# Patient Record
Sex: MALE | Race: WHITE | Employment: FULL TIME | URBAN - METROPOLITAN AREA
[De-identification: names, ages, dates, MRNs, and addresses within clinical notes are randomized per-mention and may not be internally consistent; named-entity substitution may affect disease eponyms.]

---

## 2017-01-26 ENCOUNTER — OFFICE VISIT (OUTPATIENT)
Dept: FAMILY MEDICINE | Facility: OTHER | Age: 52
End: 2017-01-26
Payer: COMMERCIAL

## 2017-01-26 VITALS
DIASTOLIC BLOOD PRESSURE: 93 MMHG | SYSTOLIC BLOOD PRESSURE: 168 MMHG | HEART RATE: 80 BPM | OXYGEN SATURATION: 99 % | TEMPERATURE: 98.3 F | RESPIRATION RATE: 16 BRPM

## 2017-01-26 DIAGNOSIS — R07.89 TIGHTNESS IN CHEST: Primary | ICD-10-CM

## 2017-01-26 DIAGNOSIS — Z77.29 CARBON MONOXIDE EXPOSURE: ICD-10-CM

## 2017-01-26 DIAGNOSIS — R07.0 THROAT PAIN: ICD-10-CM

## 2017-01-26 PROCEDURE — 93000 ELECTROCARDIOGRAM COMPLETE: CPT | Performed by: NURSE PRACTITIONER

## 2017-01-26 PROCEDURE — 99215 OFFICE O/P EST HI 40 MIN: CPT | Performed by: NURSE PRACTITIONER

## 2017-01-26 ASSESSMENT — PAIN SCALES - GENERAL: PAINLEVEL: SEVERE PAIN (6)

## 2017-01-26 NOTE — NURSING NOTE
"No chief complaint on file.      Initial /93 mmHg  Pulse 80  Temp(Src) 98.3  F (36.8  C) (Oral)  Resp 16  SpO2 99% Estimated body mass index is 29.39 kg/(m^2) as calculated from the following:    Height as of 2/10/12: 5' 6\" (1.676 m).    Weight as of 2/10/12: 182 lb (82.555 kg).  BP completed using cuff size: regular    "

## 2017-01-31 ENCOUNTER — TELEPHONE (OUTPATIENT)
Dept: FAMILY MEDICINE | Facility: OTHER | Age: 52
End: 2017-01-31

## 2017-01-31 NOTE — TELEPHONE ENCOUNTER
Pardeep Cortez is a 51 year old male who presents to clinic as a walk in with complaints of upper back pain and lightheadedness.    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 factors:  Unknown; pt walked in to the clinic last week experiencing similar symptoms along with chest pain - was sent to ED for evaluation. Pt went to [facility] and cardiac work up was negative. He was sent home on an inhaler for chest congestion. He was seen in Urgent Care this weekend and was prescribed a Z-pack. His chest congestion has improved.  Associated symptoms: none; Denies: chest pain/pressure, SOB   Improves/worsens symptoms:  Nothing seems to help sxs or make them better  Pain scale (0-10)   3/10  Last exam/Treatment:  1/26/17  Allergies:   Allergies   Allergen Reactions     No Known Drug Allergies      Vitals in clinic:   T: 98.1 (o)  P: 77  BP: 156/99    MEDICATIONS:   Taking medication(s) as prescribed? Taking Z-pack, also takes Ambien at night    NURSING PLAN: Huddle with provider, plan includes : unable to see pt today due to time of day. If pt feels unstable, he should go to ED, otherwise we can see him tomorrow    RECOMMENDED DISPOSITION:  Advised pt of above Nursing Plan per huddle with providers   Will comply with recommendation: Yes - he did not wish to schedule appt tomorrow as he is going out of town. He states that if he worsens he will go to ED. I strongly advised someone come pick him up but he insists he is stable enough to drive. His gait is steady.  If further questions/concerns or if symptoms do not improve, worsen or new symptoms develop, call your PCP or [facility] as soon as possible.      Guideline used:  Telephone Triage Protocols for  Nurses, Fourth Edition, Nola Felton  Back Pain  Message handled by Nurse Triage with Huddle - provider name: Erlinda Montero CNP (previous encounter provider) & Delio Garcia PA-C (Designated Provider).      SULLY AREVALO RN

## 2019-02-07 ENCOUNTER — MYC MEDICAL ADVICE (OUTPATIENT)
Dept: FAMILY MEDICINE | Facility: OTHER | Age: 54
End: 2019-02-07

## 2019-11-28 ENCOUNTER — MYC MEDICAL ADVICE (OUTPATIENT)
Dept: FAMILY MEDICINE | Facility: OTHER | Age: 54
End: 2019-11-28

## 2019-11-28 DIAGNOSIS — G47.00 INSOMNIA: ICD-10-CM

## 2019-12-08 ENCOUNTER — HEALTH MAINTENANCE LETTER (OUTPATIENT)
Age: 54
End: 2019-12-08

## 2021-01-09 ENCOUNTER — HEALTH MAINTENANCE LETTER (OUTPATIENT)
Age: 56
End: 2021-01-09

## 2021-10-23 ENCOUNTER — HEALTH MAINTENANCE LETTER (OUTPATIENT)
Age: 56
End: 2021-10-23

## 2022-02-12 ENCOUNTER — HEALTH MAINTENANCE LETTER (OUTPATIENT)
Age: 57
End: 2022-02-12

## 2022-10-09 ENCOUNTER — HEALTH MAINTENANCE LETTER (OUTPATIENT)
Age: 57
End: 2022-10-09

## 2023-01-16 ENCOUNTER — OFFICE VISIT (OUTPATIENT)
Dept: URBAN - METROPOLITAN AREA CLINIC 85 | Facility: CLINIC | Age: 58
End: 2023-01-16
Payer: COMMERCIAL

## 2023-01-16 DIAGNOSIS — H10.9 CONJUNCTIVITIS: Primary | ICD-10-CM

## 2023-01-16 DIAGNOSIS — H04.123 DRY EYE SYNDROME OF BILATERAL LACRIMAL GLANDS: ICD-10-CM

## 2023-01-16 DIAGNOSIS — H25.13 AGE-RELATED NUCLEAR CATARACT, BILATERAL: ICD-10-CM

## 2023-01-16 PROCEDURE — 92004 COMPRE OPH EXAM NEW PT 1/>: CPT | Performed by: OPHTHALMOLOGY

## 2023-01-16 RX ORDER — OFLOXACIN 3 MG/ML
0.3 % SOLUTION/ DROPS OPHTHALMIC
Qty: 1 | Refills: 1 | Status: ACTIVE
Start: 2023-01-16

## 2023-01-16 ASSESSMENT — INTRAOCULAR PRESSURE
OS: 20
OD: 22

## 2023-01-16 ASSESSMENT — VISUAL ACUITY
OD: 20/30
OS: 20/20

## 2023-01-16 NOTE — IMPRESSION/PLAN
Impression: Conjunctivitis: H10.9. Plan: Discussed diagnosis in detail with patient. Will continue to observe condition and or symptoms. D/C Lotemax and Initiate Ocuflox OU QID for 1 week then D/C. Patient instructed to call if condition gets worse. RTC in 1 year for follow up or   ASAP if there should be any decrease in vision, pain, or any worsening of condition.

## 2023-01-16 NOTE — IMPRESSION/PLAN
Impression: Age-related nuclear cataract, bilateral: H25.13. Plan: Discussed findings. Discussed option of CE/IOL OU. Patient understands cataract effect on vision. Patient defers to have  CE w/IOL consult with  Dr. Saurabh Hopkins at this time. Continue to monitor. RTC 1 year CEE or ASAP if decreased VA or pain.

## 2023-03-25 ENCOUNTER — HEALTH MAINTENANCE LETTER (OUTPATIENT)
Age: 58
End: 2023-03-25